# Patient Record
Sex: FEMALE | Race: WHITE | NOT HISPANIC OR LATINO | URBAN - METROPOLITAN AREA
[De-identification: names, ages, dates, MRNs, and addresses within clinical notes are randomized per-mention and may not be internally consistent; named-entity substitution may affect disease eponyms.]

---

## 2019-04-26 ENCOUNTER — EMERGENCY (EMERGENCY)
Facility: HOSPITAL | Age: 59
LOS: 1 days | Discharge: HOME | End: 2019-04-26
Attending: EMERGENCY MEDICINE | Admitting: EMERGENCY MEDICINE
Payer: COMMERCIAL

## 2019-04-26 VITALS
RESPIRATION RATE: 18 BRPM | DIASTOLIC BLOOD PRESSURE: 111 MMHG | OXYGEN SATURATION: 99 % | SYSTOLIC BLOOD PRESSURE: 197 MMHG | HEART RATE: 108 BPM

## 2019-04-26 DIAGNOSIS — Z90.710 ACQUIRED ABSENCE OF BOTH CERVIX AND UTERUS: Chronic | ICD-10-CM

## 2019-04-26 LAB
ALBUMIN SERPL ELPH-MCNC: 3.9 G/DL — SIGNIFICANT CHANGE UP (ref 3.3–5)
ALP SERPL-CCNC: 113 U/L — SIGNIFICANT CHANGE UP (ref 40–120)
ALT FLD-CCNC: 27 U/L — SIGNIFICANT CHANGE UP (ref 12–78)
ANION GAP SERPL CALC-SCNC: 7 MMOL/L — SIGNIFICANT CHANGE UP (ref 5–17)
APTT BLD: 30 SEC — SIGNIFICANT CHANGE UP (ref 28.5–37)
AST SERPL-CCNC: 22 U/L — SIGNIFICANT CHANGE UP (ref 15–37)
BASOPHILS # BLD AUTO: 0.03 K/UL — SIGNIFICANT CHANGE UP (ref 0–0.2)
BASOPHILS NFR BLD AUTO: 0.4 % — SIGNIFICANT CHANGE UP (ref 0–2)
BILIRUB SERPL-MCNC: 0.3 MG/DL — SIGNIFICANT CHANGE UP (ref 0.2–1.2)
BUN SERPL-MCNC: 21 MG/DL — SIGNIFICANT CHANGE UP (ref 7–23)
CALCIUM SERPL-MCNC: 9.1 MG/DL — SIGNIFICANT CHANGE UP (ref 8.5–10.1)
CHLORIDE SERPL-SCNC: 106 MMOL/L — SIGNIFICANT CHANGE UP (ref 96–108)
CO2 SERPL-SCNC: 28 MMOL/L — SIGNIFICANT CHANGE UP (ref 22–31)
CREAT SERPL-MCNC: 0.94 MG/DL — SIGNIFICANT CHANGE UP (ref 0.5–1.3)
EOSINOPHIL # BLD AUTO: 0.08 K/UL — SIGNIFICANT CHANGE UP (ref 0–0.5)
EOSINOPHIL NFR BLD AUTO: 1 % — SIGNIFICANT CHANGE UP (ref 0–6)
GLUCOSE SERPL-MCNC: 109 MG/DL — HIGH (ref 70–99)
HCT VFR BLD CALC: 42 % — SIGNIFICANT CHANGE UP (ref 34.5–45)
HGB BLD-MCNC: 14.2 G/DL — SIGNIFICANT CHANGE UP (ref 11.5–15.5)
IMM GRANULOCYTES NFR BLD AUTO: 0.2 % — SIGNIFICANT CHANGE UP (ref 0–1.5)
INR BLD: 0.91 RATIO — SIGNIFICANT CHANGE UP (ref 0.88–1.16)
LACTATE SERPL-SCNC: 1.6 MMOL/L — SIGNIFICANT CHANGE UP (ref 0.7–2)
LIDOCAIN IGE QN: 147 U/L — SIGNIFICANT CHANGE UP (ref 73–393)
LYMPHOCYTES # BLD AUTO: 2.25 K/UL — SIGNIFICANT CHANGE UP (ref 1–3.3)
LYMPHOCYTES # BLD AUTO: 28.1 % — SIGNIFICANT CHANGE UP (ref 13–44)
MCHC RBC-ENTMCNC: 32.2 PG — SIGNIFICANT CHANGE UP (ref 27–34)
MCHC RBC-ENTMCNC: 33.8 GM/DL — SIGNIFICANT CHANGE UP (ref 32–36)
MCV RBC AUTO: 95.2 FL — SIGNIFICANT CHANGE UP (ref 80–100)
MONOCYTES # BLD AUTO: 0.55 K/UL — SIGNIFICANT CHANGE UP (ref 0–0.9)
MONOCYTES NFR BLD AUTO: 6.9 % — SIGNIFICANT CHANGE UP (ref 2–14)
NEUTROPHILS # BLD AUTO: 5.09 K/UL — SIGNIFICANT CHANGE UP (ref 1.8–7.4)
NEUTROPHILS NFR BLD AUTO: 63.4 % — SIGNIFICANT CHANGE UP (ref 43–77)
NRBC # BLD: 0 /100 WBCS — SIGNIFICANT CHANGE UP (ref 0–0)
PLATELET # BLD AUTO: 229 K/UL — SIGNIFICANT CHANGE UP (ref 150–400)
POTASSIUM SERPL-MCNC: 3.6 MMOL/L — SIGNIFICANT CHANGE UP (ref 3.5–5.3)
POTASSIUM SERPL-SCNC: 3.6 MMOL/L — SIGNIFICANT CHANGE UP (ref 3.5–5.3)
PROT SERPL-MCNC: 7.5 G/DL — SIGNIFICANT CHANGE UP (ref 6–8.3)
PROTHROM AB SERPL-ACNC: 10.3 SEC — SIGNIFICANT CHANGE UP (ref 10–12.9)
RBC # BLD: 4.41 M/UL — SIGNIFICANT CHANGE UP (ref 3.8–5.2)
RBC # FLD: 11.9 % — SIGNIFICANT CHANGE UP (ref 10.3–14.5)
SODIUM SERPL-SCNC: 141 MMOL/L — SIGNIFICANT CHANGE UP (ref 135–145)
WBC # BLD: 8.02 K/UL — SIGNIFICANT CHANGE UP (ref 3.8–10.5)
WBC # FLD AUTO: 8.02 K/UL — SIGNIFICANT CHANGE UP (ref 3.8–10.5)

## 2019-04-26 PROCEDURE — 99285 EMERGENCY DEPT VISIT HI MDM: CPT

## 2019-04-26 PROCEDURE — 74176 CT ABD & PELVIS W/O CONTRAST: CPT | Mod: 26

## 2019-04-26 RX ORDER — HYDROMORPHONE HYDROCHLORIDE 2 MG/ML
0.5 INJECTION INTRAMUSCULAR; INTRAVENOUS; SUBCUTANEOUS ONCE
Qty: 0 | Refills: 0 | Status: DISCONTINUED | OUTPATIENT
Start: 2019-04-26 | End: 2019-04-26

## 2019-04-26 RX ORDER — MORPHINE SULFATE 50 MG/1
4 CAPSULE, EXTENDED RELEASE ORAL ONCE
Qty: 0 | Refills: 0 | Status: DISCONTINUED | OUTPATIENT
Start: 2019-04-26 | End: 2019-04-26

## 2019-04-26 RX ORDER — TAMSULOSIN HYDROCHLORIDE 0.4 MG/1
0.4 CAPSULE ORAL ONCE
Qty: 0 | Refills: 0 | Status: COMPLETED | OUTPATIENT
Start: 2019-04-26 | End: 2019-04-26

## 2019-04-26 RX ORDER — PIPERACILLIN AND TAZOBACTAM 4; .5 G/20ML; G/20ML
3.38 INJECTION, POWDER, LYOPHILIZED, FOR SOLUTION INTRAVENOUS ONCE
Qty: 0 | Refills: 0 | Status: COMPLETED | OUTPATIENT
Start: 2019-04-26 | End: 2019-04-26

## 2019-04-26 RX ORDER — ONDANSETRON 8 MG/1
4 TABLET, FILM COATED ORAL ONCE
Qty: 0 | Refills: 0 | Status: COMPLETED | OUTPATIENT
Start: 2019-04-26 | End: 2019-04-26

## 2019-04-26 RX ORDER — SODIUM CHLORIDE 9 MG/ML
3 INJECTION INTRAMUSCULAR; INTRAVENOUS; SUBCUTANEOUS ONCE
Qty: 0 | Refills: 0 | Status: COMPLETED | OUTPATIENT
Start: 2019-04-26 | End: 2019-04-26

## 2019-04-26 RX ORDER — SODIUM CHLORIDE 9 MG/ML
1000 INJECTION INTRAMUSCULAR; INTRAVENOUS; SUBCUTANEOUS ONCE
Qty: 0 | Refills: 0 | Status: COMPLETED | OUTPATIENT
Start: 2019-04-26 | End: 2019-04-26

## 2019-04-26 RX ORDER — SODIUM CHLORIDE 9 MG/ML
1000 INJECTION INTRAMUSCULAR; INTRAVENOUS; SUBCUTANEOUS
Qty: 0 | Refills: 0 | Status: DISCONTINUED | OUTPATIENT
Start: 2019-04-26 | End: 2019-04-30

## 2019-04-26 RX ADMIN — MORPHINE SULFATE 4 MILLIGRAM(S): 50 CAPSULE, EXTENDED RELEASE ORAL at 22:06

## 2019-04-26 RX ADMIN — SODIUM CHLORIDE 3 MILLILITER(S): 9 INJECTION INTRAMUSCULAR; INTRAVENOUS; SUBCUTANEOUS at 22:07

## 2019-04-26 RX ADMIN — SODIUM CHLORIDE 125 MILLILITER(S): 9 INJECTION INTRAMUSCULAR; INTRAVENOUS; SUBCUTANEOUS at 23:56

## 2019-04-26 RX ADMIN — ONDANSETRON 4 MILLIGRAM(S): 8 TABLET, FILM COATED ORAL at 22:06

## 2019-04-26 RX ADMIN — HYDROMORPHONE HYDROCHLORIDE 0.5 MILLIGRAM(S): 2 INJECTION INTRAMUSCULAR; INTRAVENOUS; SUBCUTANEOUS at 22:45

## 2019-04-26 RX ADMIN — HYDROMORPHONE HYDROCHLORIDE 0.5 MILLIGRAM(S): 2 INJECTION INTRAMUSCULAR; INTRAVENOUS; SUBCUTANEOUS at 22:34

## 2019-04-26 RX ADMIN — TAMSULOSIN HYDROCHLORIDE 0.4 MILLIGRAM(S): 0.4 CAPSULE ORAL at 23:57

## 2019-04-26 RX ADMIN — PIPERACILLIN AND TAZOBACTAM 200 GRAM(S): 4; .5 INJECTION, POWDER, LYOPHILIZED, FOR SOLUTION INTRAVENOUS at 23:57

## 2019-04-26 RX ADMIN — SODIUM CHLORIDE 1000 MILLILITER(S): 9 INJECTION INTRAMUSCULAR; INTRAVENOUS; SUBCUTANEOUS at 23:06

## 2019-04-26 RX ADMIN — SODIUM CHLORIDE 1000 MILLILITER(S): 9 INJECTION INTRAMUSCULAR; INTRAVENOUS; SUBCUTANEOUS at 22:06

## 2019-04-26 RX ADMIN — MORPHINE SULFATE 4 MILLIGRAM(S): 50 CAPSULE, EXTENDED RELEASE ORAL at 22:20

## 2019-04-26 NOTE — ED ADULT TRIAGE NOTE - CHIEF COMPLAINT QUOTE
patient c/o RLQ pain radiating around to flank area, states she believes she has a kidney stone, crying in triage

## 2019-04-26 NOTE — ED PROVIDER NOTE - OBJECTIVE STATEMENT
PT is a 59 yo f who has remote hx of renal colic  sp hyst allergic to keflex, hx fo mvp, was in [facility]   saw her pmd dr ward for routine well visit found blood in urine. today developed intermittent and tonight severe r flank pain to her groin and abd the pain is intermittent 10 /10 bib  for eval. on arrival to er the pain was almost gone

## 2019-04-26 NOTE — ED PROVIDER NOTE - NSFOLLOWUPINSTRUCTIONS_ED_ALL_ED_FT
1) Follow-up with your Primary Medical Doctor or referred doctor. Call today / next business day for prompt follow-up.  2) Return to Emergency room for any worsening or persistent pain, weakness,  vomiting, diarrhea, unable to eat / drink, weak or dizzy, or any other concerning symptoms.   -If you have ANY FEVER, you must return to ED immediately  3) See attached instruction sheets for additional information, including information regarding signs and symptoms to look out for, reasons to seek immediate care and other important instructions.  4) Follow-up with Urology, see attached. Call ASAP for appointment.

## 2019-04-26 NOTE — ED ADULT NURSE NOTE - OBJECTIVE STATEMENT
Pt received alert and oriented x 4 c/o pain to mid lower abd radiating to RLQ to right flank and right lower back, 10/10 sharp x hours. Pt reported nausea but denies vomiting, diarrheam fever, chill, sob, chest pain. Pt denies any relieving/ aggravating factors. Pt reports seeing PMD and PMD called today and stated blood in urine. Pt denies dysuria, burning on urination.

## 2019-04-26 NOTE — ED PROVIDER NOTE - CLINICAL SUMMARY MEDICAL DECISION MAKING FREE TEXT BOX
ro appy ro renal colic ro other causes of severe r flank pain nausea in female pt with hx of remote stones and severe pain with hematuria

## 2019-04-26 NOTE — ED PROVIDER NOTE - PROGRESS NOTE DETAILS
pt came from exam room diaphoretic and very uncomfortable pt and  aware of dx she had another bout of pain now better pain resolved iv abx and fluids infusing

## 2019-04-27 VITALS
DIASTOLIC BLOOD PRESSURE: 82 MMHG | SYSTOLIC BLOOD PRESSURE: 157 MMHG | RESPIRATION RATE: 16 BRPM | TEMPERATURE: 98 F | HEART RATE: 81 BPM | OXYGEN SATURATION: 99 %

## 2019-04-27 LAB
APPEARANCE UR: CLEAR — SIGNIFICANT CHANGE UP
BACTERIA # UR AUTO: ABNORMAL
BILIRUB UR-MCNC: NEGATIVE — SIGNIFICANT CHANGE UP
COLOR SPEC: SIGNIFICANT CHANGE UP
COMMENT - URINE: SIGNIFICANT CHANGE UP
DIFF PNL FLD: ABNORMAL
EPI CELLS # UR: SIGNIFICANT CHANGE UP
GLUCOSE UR QL: NEGATIVE — SIGNIFICANT CHANGE UP
KETONES UR-MCNC: NEGATIVE — SIGNIFICANT CHANGE UP
LEUKOCYTE ESTERASE UR-ACNC: NEGATIVE — SIGNIFICANT CHANGE UP
NITRITE UR-MCNC: NEGATIVE — SIGNIFICANT CHANGE UP
PH UR: 7 — SIGNIFICANT CHANGE UP (ref 5–8)
PROT UR-MCNC: NEGATIVE — SIGNIFICANT CHANGE UP
RBC CASTS # UR COMP ASSIST: ABNORMAL /HPF (ref 0–4)
SP GR SPEC: 1.01 — SIGNIFICANT CHANGE UP (ref 1.01–1.02)
UROBILINOGEN FLD QL: NEGATIVE — SIGNIFICANT CHANGE UP
WBC UR QL: SIGNIFICANT CHANGE UP

## 2019-04-27 PROCEDURE — 85027 COMPLETE CBC AUTOMATED: CPT

## 2019-04-27 PROCEDURE — 87086 URINE CULTURE/COLONY COUNT: CPT

## 2019-04-27 PROCEDURE — 87040 BLOOD CULTURE FOR BACTERIA: CPT

## 2019-04-27 PROCEDURE — 74176 CT ABD & PELVIS W/O CONTRAST: CPT

## 2019-04-27 PROCEDURE — 85730 THROMBOPLASTIN TIME PARTIAL: CPT

## 2019-04-27 PROCEDURE — 96375 TX/PRO/DX INJ NEW DRUG ADDON: CPT

## 2019-04-27 PROCEDURE — 96376 TX/PRO/DX INJ SAME DRUG ADON: CPT

## 2019-04-27 PROCEDURE — 96361 HYDRATE IV INFUSION ADD-ON: CPT

## 2019-04-27 PROCEDURE — 99284 EMERGENCY DEPT VISIT MOD MDM: CPT | Mod: 25

## 2019-04-27 PROCEDURE — 80053 COMPREHEN METABOLIC PANEL: CPT

## 2019-04-27 PROCEDURE — 83690 ASSAY OF LIPASE: CPT

## 2019-04-27 PROCEDURE — 85610 PROTHROMBIN TIME: CPT

## 2019-04-27 PROCEDURE — 96365 THER/PROPH/DIAG IV INF INIT: CPT

## 2019-04-27 PROCEDURE — 83605 ASSAY OF LACTIC ACID: CPT

## 2019-04-27 PROCEDURE — 81001 URINALYSIS AUTO W/SCOPE: CPT

## 2019-04-27 PROCEDURE — 36415 COLL VENOUS BLD VENIPUNCTURE: CPT

## 2019-04-27 RX ORDER — AZTREONAM 2 G
1 VIAL (EA) INJECTION
Qty: 14 | Refills: 0
Start: 2019-04-27 | End: 2019-05-03

## 2019-04-27 RX ORDER — ONDANSETRON 8 MG/1
1 TABLET, FILM COATED ORAL
Qty: 30 | Refills: 0
Start: 2019-04-27

## 2019-04-27 RX ORDER — ONDANSETRON 8 MG/1
4 TABLET, FILM COATED ORAL ONCE
Qty: 0 | Refills: 0 | Status: COMPLETED | OUTPATIENT
Start: 2019-04-27 | End: 2019-04-27

## 2019-04-27 RX ORDER — TAMSULOSIN HYDROCHLORIDE 0.4 MG/1
1 CAPSULE ORAL
Qty: 30 | Refills: 0
Start: 2019-04-27 | End: 2019-05-26

## 2019-04-27 RX ADMIN — ONDANSETRON 4 MILLIGRAM(S): 8 TABLET, FILM COATED ORAL at 04:20

## 2019-04-27 RX ADMIN — HYDROMORPHONE HYDROCHLORIDE 0.5 MILLIGRAM(S): 2 INJECTION INTRAMUSCULAR; INTRAVENOUS; SUBCUTANEOUS at 00:05

## 2019-04-27 RX ADMIN — PIPERACILLIN AND TAZOBACTAM 3.38 GRAM(S): 4; .5 INJECTION, POWDER, LYOPHILIZED, FOR SOLUTION INTRAVENOUS at 00:27

## 2019-04-27 RX ADMIN — HYDROMORPHONE HYDROCHLORIDE 0.5 MILLIGRAM(S): 2 INJECTION INTRAMUSCULAR; INTRAVENOUS; SUBCUTANEOUS at 00:20

## 2019-04-27 RX ADMIN — SODIUM CHLORIDE 125 MILLILITER(S): 9 INJECTION INTRAMUSCULAR; INTRAVENOUS; SUBCUTANEOUS at 00:05

## 2019-04-27 RX ADMIN — ONDANSETRON 4 MILLIGRAM(S): 8 TABLET, FILM COATED ORAL at 00:06

## 2019-04-28 LAB
CULTURE RESULTS: NO GROWTH — SIGNIFICANT CHANGE UP
SPECIMEN SOURCE: SIGNIFICANT CHANGE UP

## 2019-05-02 LAB
CULTURE RESULTS: SIGNIFICANT CHANGE UP
CULTURE RESULTS: SIGNIFICANT CHANGE UP
SPECIMEN SOURCE: SIGNIFICANT CHANGE UP
SPECIMEN SOURCE: SIGNIFICANT CHANGE UP

## 2022-03-01 ENCOUNTER — INPATIENT (INPATIENT)
Facility: HOSPITAL | Age: 62
LOS: 1 days | Discharge: HOME | DRG: 390 | End: 2022-03-03
Attending: SURGERY | Admitting: SURGERY
Payer: COMMERCIAL

## 2022-03-01 VITALS
HEIGHT: 65 IN | TEMPERATURE: 98 F | WEIGHT: 154.98 LBS | SYSTOLIC BLOOD PRESSURE: 162 MMHG | OXYGEN SATURATION: 100 % | DIASTOLIC BLOOD PRESSURE: 102 MMHG | RESPIRATION RATE: 22 BRPM | HEART RATE: 82 BPM

## 2022-03-01 DIAGNOSIS — Z90.710 ACQUIRED ABSENCE OF BOTH CERVIX AND UTERUS: Chronic | ICD-10-CM

## 2022-03-01 LAB
ALBUMIN SERPL ELPH-MCNC: 4.1 G/DL — SIGNIFICANT CHANGE UP (ref 3.3–5)
ALP SERPL-CCNC: 84 U/L — SIGNIFICANT CHANGE UP (ref 40–120)
ALT FLD-CCNC: 29 U/L — SIGNIFICANT CHANGE UP (ref 12–78)
ANION GAP SERPL CALC-SCNC: 7 MMOL/L — SIGNIFICANT CHANGE UP (ref 5–17)
AST SERPL-CCNC: 28 U/L — SIGNIFICANT CHANGE UP (ref 15–37)
BASOPHILS # BLD AUTO: 0.03 K/UL — SIGNIFICANT CHANGE UP (ref 0–0.2)
BASOPHILS NFR BLD AUTO: 0.3 % — SIGNIFICANT CHANGE UP (ref 0–2)
BILIRUB SERPL-MCNC: 0.6 MG/DL — SIGNIFICANT CHANGE UP (ref 0.2–1.2)
BUN SERPL-MCNC: 17 MG/DL — SIGNIFICANT CHANGE UP (ref 7–23)
CALCIUM SERPL-MCNC: 9.8 MG/DL — SIGNIFICANT CHANGE UP (ref 8.5–10.1)
CHLORIDE SERPL-SCNC: 107 MMOL/L — SIGNIFICANT CHANGE UP (ref 96–108)
CO2 SERPL-SCNC: 28 MMOL/L — SIGNIFICANT CHANGE UP (ref 22–31)
CREAT SERPL-MCNC: 0.74 MG/DL — SIGNIFICANT CHANGE UP (ref 0.5–1.3)
EGFR: 91 ML/MIN/1.73M2 — SIGNIFICANT CHANGE UP
EOSINOPHIL # BLD AUTO: 0.06 K/UL — SIGNIFICANT CHANGE UP (ref 0–0.5)
EOSINOPHIL NFR BLD AUTO: 0.7 % — SIGNIFICANT CHANGE UP (ref 0–6)
GLUCOSE SERPL-MCNC: 106 MG/DL — HIGH (ref 70–99)
HCT VFR BLD CALC: 43.3 % — SIGNIFICANT CHANGE UP (ref 34.5–45)
HGB BLD-MCNC: 15 G/DL — SIGNIFICANT CHANGE UP (ref 11.5–15.5)
IMM GRANULOCYTES NFR BLD AUTO: 0.3 % — SIGNIFICANT CHANGE UP (ref 0–1.5)
LIDOCAIN IGE QN: 86 U/L — SIGNIFICANT CHANGE UP (ref 73–393)
LYMPHOCYTES # BLD AUTO: 2.23 K/UL — SIGNIFICANT CHANGE UP (ref 1–3.3)
LYMPHOCYTES # BLD AUTO: 25.2 % — SIGNIFICANT CHANGE UP (ref 13–44)
MCHC RBC-ENTMCNC: 32.7 PG — SIGNIFICANT CHANGE UP (ref 27–34)
MCHC RBC-ENTMCNC: 34.6 GM/DL — SIGNIFICANT CHANGE UP (ref 32–36)
MCV RBC AUTO: 94.3 FL — SIGNIFICANT CHANGE UP (ref 80–100)
MONOCYTES # BLD AUTO: 0.54 K/UL — SIGNIFICANT CHANGE UP (ref 0–0.9)
MONOCYTES NFR BLD AUTO: 6.1 % — SIGNIFICANT CHANGE UP (ref 2–14)
NEUTROPHILS # BLD AUTO: 5.97 K/UL — SIGNIFICANT CHANGE UP (ref 1.8–7.4)
NEUTROPHILS NFR BLD AUTO: 67.4 % — SIGNIFICANT CHANGE UP (ref 43–77)
NRBC # BLD: 0 /100 WBCS — SIGNIFICANT CHANGE UP (ref 0–0)
PLATELET # BLD AUTO: 252 K/UL — SIGNIFICANT CHANGE UP (ref 150–400)
POTASSIUM SERPL-MCNC: 3.9 MMOL/L — SIGNIFICANT CHANGE UP (ref 3.5–5.3)
POTASSIUM SERPL-SCNC: 3.9 MMOL/L — SIGNIFICANT CHANGE UP (ref 3.5–5.3)
PROT SERPL-MCNC: 7.7 G/DL — SIGNIFICANT CHANGE UP (ref 6–8.3)
RBC # BLD: 4.59 M/UL — SIGNIFICANT CHANGE UP (ref 3.8–5.2)
RBC # FLD: 12 % — SIGNIFICANT CHANGE UP (ref 10.3–14.5)
SARS-COV-2 RNA SPEC QL NAA+PROBE: SIGNIFICANT CHANGE UP
SODIUM SERPL-SCNC: 142 MMOL/L — SIGNIFICANT CHANGE UP (ref 135–145)
TROPONIN I, HIGH SENSITIVITY RESULT: 7.6 NG/L — SIGNIFICANT CHANGE UP
WBC # BLD: 8.86 K/UL — SIGNIFICANT CHANGE UP (ref 3.8–10.5)
WBC # FLD AUTO: 8.86 K/UL — SIGNIFICANT CHANGE UP (ref 3.8–10.5)

## 2022-03-01 PROCEDURE — 93010 ELECTROCARDIOGRAM REPORT: CPT

## 2022-03-01 PROCEDURE — 99285 EMERGENCY DEPT VISIT HI MDM: CPT

## 2022-03-01 PROCEDURE — 71045 X-RAY EXAM CHEST 1 VIEW: CPT | Mod: 26

## 2022-03-01 PROCEDURE — 76700 US EXAM ABDOM COMPLETE: CPT | Mod: 26

## 2022-03-01 RX ORDER — SODIUM CHLORIDE 9 MG/ML
1000 INJECTION INTRAMUSCULAR; INTRAVENOUS; SUBCUTANEOUS ONCE
Refills: 0 | Status: COMPLETED | OUTPATIENT
Start: 2022-03-01 | End: 2022-03-01

## 2022-03-01 RX ORDER — ONDANSETRON 8 MG/1
4 TABLET, FILM COATED ORAL ONCE
Refills: 0 | Status: COMPLETED | OUTPATIENT
Start: 2022-03-01 | End: 2022-03-01

## 2022-03-01 RX ORDER — MORPHINE SULFATE 50 MG/1
4 CAPSULE, EXTENDED RELEASE ORAL ONCE
Refills: 0 | Status: DISCONTINUED | OUTPATIENT
Start: 2022-03-01 | End: 2022-03-01

## 2022-03-01 RX ADMIN — SODIUM CHLORIDE 1000 MILLILITER(S): 9 INJECTION INTRAMUSCULAR; INTRAVENOUS; SUBCUTANEOUS at 20:58

## 2022-03-01 RX ADMIN — MORPHINE SULFATE 4 MILLIGRAM(S): 50 CAPSULE, EXTENDED RELEASE ORAL at 20:58

## 2022-03-01 RX ADMIN — ONDANSETRON 4 MILLIGRAM(S): 8 TABLET, FILM COATED ORAL at 20:58

## 2022-03-01 NOTE — ED PROVIDER NOTE - OBJECTIVE STATEMENT
pt c/o few hours of cramping upper abd pain radiating across upper abd and into chest, associated with n/v. no fevers, chills, sob, cough, diarrhea, dysuria, hematuria, freq.  pmd - li

## 2022-03-01 NOTE — ED PROVIDER NOTE - CLINICAL SUMMARY MEDICAL DECISION MAKING FREE TEXT BOX
pt with few hrs of upper abd cramping pain radiating across upper abd and into chest - ekg/cxr/us/labs/ivf/morphine/zofran

## 2022-03-02 DIAGNOSIS — K56.609 UNSPECIFIED INTESTINAL OBSTRUCTION, UNSPECIFIED AS TO PARTIAL VERSUS COMPLETE OBSTRUCTION: ICD-10-CM

## 2022-03-02 LAB — LACTATE SERPL-SCNC: 0.7 MMOL/L — SIGNIFICANT CHANGE UP (ref 0.7–2)

## 2022-03-02 PROCEDURE — 74250 X-RAY XM SM INT 1CNTRST STD: CPT | Mod: 26

## 2022-03-02 PROCEDURE — 71275 CT ANGIOGRAPHY CHEST: CPT | Mod: 26,MA

## 2022-03-02 PROCEDURE — 74177 CT ABD & PELVIS W/CONTRAST: CPT | Mod: 26,MA

## 2022-03-02 PROCEDURE — 99223 1ST HOSP IP/OBS HIGH 75: CPT

## 2022-03-02 RX ORDER — DIATRIZOATE MEGLUMINE 180 MG/ML
100 INJECTION, SOLUTION INTRAVESICAL ONCE
Refills: 0 | Status: COMPLETED | OUTPATIENT
Start: 2022-03-02 | End: 2022-03-02

## 2022-03-02 RX ORDER — SODIUM CHLORIDE 9 MG/ML
1000 INJECTION, SOLUTION INTRAVENOUS
Refills: 0 | Status: DISCONTINUED | OUTPATIENT
Start: 2022-03-02 | End: 2022-03-02

## 2022-03-02 RX ORDER — METOPROLOL TARTRATE 50 MG
25 TABLET ORAL DAILY
Refills: 0 | Status: DISCONTINUED | OUTPATIENT
Start: 2022-03-02 | End: 2022-03-03

## 2022-03-02 RX ORDER — ONDANSETRON 8 MG/1
4 TABLET, FILM COATED ORAL EVERY 6 HOURS
Refills: 0 | Status: DISCONTINUED | OUTPATIENT
Start: 2022-03-02 | End: 2022-03-03

## 2022-03-02 RX ORDER — SODIUM CHLORIDE 9 MG/ML
1000 INJECTION, SOLUTION INTRAVENOUS
Refills: 0 | Status: DISCONTINUED | OUTPATIENT
Start: 2022-03-02 | End: 2022-03-03

## 2022-03-02 RX ADMIN — DIATRIZOATE MEGLUMINE 100 MILLILITER(S): 180 INJECTION, SOLUTION INTRAVESICAL at 09:44

## 2022-03-02 RX ADMIN — Medication 25 MILLIGRAM(S): at 06:33

## 2022-03-02 RX ADMIN — SODIUM CHLORIDE 100 MILLILITER(S): 9 INJECTION, SOLUTION INTRAVENOUS at 18:11

## 2022-03-02 NOTE — PATIENT PROFILE ADULT - FALL HARM RISK - UNIVERSAL INTERVENTIONS
Bed in lowest position, wheels locked, appropriate side rails in place/Call bell, personal items and telephone in reach/Instruct patient to call for assistance before getting out of bed or chair/Non-slip footwear when patient is out of bed/[place] to call system/Physically safe environment - no spills, clutter or unnecessary equipment/Purposeful Proactive Rounding/Room/bathroom lighting operational, light cord in reach

## 2022-03-02 NOTE — H&P ADULT - ATTENDING COMMENTS
Case discussed with PA earlier and seen by me independently shortly thereafter.  History, blood work and imaging personally reviewed.  Pt examined.  Resting comfortably in stretcher in ED.  NO acute distress.  States pain began while at work yesterday approx 5-530pm.  States she wasn't eating as per usual.  Only ate "junk", chips, etc.  Thought pains were similar to symptoms she experienced with previous kidney stones but this was more in the from of her abdomen and didn't' seem to relent.  She vomited once while in ED last night.  Now states she's feeling much better as colicky pain has subsided.    Abdomen remains soft, nondistended.  No rebound or guarding.  Surgical scars consistent with /Hysterectomy.  No hernia or masses.    CT scan suggestive of early or partial SBO.  Trace fluid.  Transition in upper abdomen.  This however was performed without oral contrast to the extent of obstruction is difficult to ascertain.  Bowel loops are not markedly dilated although somewhat fluid filled immediately proximal to the "transition".  Colon remains air and fecal filled.    I suspect the CT findings could represent ileus vs partial SBO.  As such she has been admitted for observation.  Will order Gastrografin challenge/SBS to assess progression and transit through GI tract in hopes the "obstruction" has resolved.    I've discussed plans with patient as well as potential need for surgical exploration (laparoscopy vs open) in the event that symptoms fail to resolve and/or "obstruction" persists.    All questions answered.

## 2022-03-02 NOTE — DISCHARGE NOTE PROVIDER - NSDCMRMEDTOKEN_GEN_ALL_CORE_FT
metoprolol tartrate 25 mg oral tablet:    metoprolol tartrate 25 mg oral tablet: 1 tab(s) orally once a day

## 2022-03-02 NOTE — DISCHARGE NOTE PROVIDER - CARE PROVIDER_API CALL
Yaw Smith)  Surgery  [address] [place] [address]  Phone: [phone]  Fax: [phone]  Follow Up Time:

## 2022-03-02 NOTE — H&P ADULT - NEGATIVE GASTROINTESTINAL SYMPTOMS
no diarrhea/no constipation/no change in bowel habits/no melena/no hematochezia/no steatorrhea/no jaundice/no hiccoughs

## 2022-03-02 NOTE — DISCHARGE NOTE PROVIDER - HOSPITAL COURSE
HPI: 62 YO FEMALE WITH PAST MEDICAL HISTORY OF HTN WAS IN HER USUAL STATE OF HEALTH UNTIL 5 PM YESTERDAY WHEN SHE EXPERIENCE SUDDEN SEVERE LOWER ABDOMINAL PAIN AND URGENCY TO DEFECATE. HER ABDOMINAL PAIN GOT WORSE AFTER BOWEL MOVEMENT AND BECAME NAUSEOUS UPON ARRIVAL TO ER AND HAD ONE EPISODE OF VOMITING. SHE DENIES ANY FEVER, CHILLS, DIARRHEA,  CONSTIPATION, RECENT TRAVEL ,HEPATITIS, HISTORY OF GALLSTONES OR  PUD, MELENA, HEMATOCHEZIA, NSAIDs/ DRUGS / ETOH USE, TRAUMA, URINARY SYMPTOMS. SHE ATE ALL DAY UNTIL START OF HER PAIN AT 5 PM, SHE HAD A NORMAL BM AFTER 5 PM. HER LAST COLONOSCOPY WAS OVER 5 YEARS AGO.    In the ED: Pt vomited once in ED  Abdominal US showed no evidence of cholelithiasis. Pt also worked up for possible PE. CT abd/pelvis reading revealed, "No pulmonary embolism or other acute intrathoracic pathology. Low grade distal small bowel obstruction" with transition in upper abdomen. Following CTA also negative. At time of consult, pt's abdominal  pain subsided.   At the time of imaging, considering ileus vs partial SBO, pt admitted for observation.    On floor: Gastrograffin challenge ordered, contrast was seen ____________.    Patient improved clinically throughout hospitalization, remained hemodynamically stable throughout hospital stay. Overall stay uneventful.           HPI: 62 YO FEMALE WITH PAST MEDICAL HISTORY OF HTN WAS IN HER USUAL STATE OF HEALTH UNTIL 5 PM YESTERDAY WHEN SHE EXPERIENCE SUDDEN SEVERE LOWER ABDOMINAL PAIN AND URGENCY TO DEFECATE. HER ABDOMINAL PAIN GOT WORSE AFTER BOWEL MOVEMENT AND BECAME NAUSEOUS UPON ARRIVAL TO ER AND HAD ONE EPISODE OF VOMITING. SHE DENIES ANY FEVER, CHILLS, DIARRHEA,  CONSTIPATION, RECENT TRAVEL ,HEPATITIS, HISTORY OF GALLSTONES OR  PUD, MELENA, HEMATOCHEZIA, NSAIDs/ DRUGS / ETOH USE, TRAUMA, URINARY SYMPTOMS. SHE ATE ALL DAY UNTIL START OF HER PAIN AT 5 PM, SHE HAD A NORMAL BM AFTER 5 PM. HER LAST COLONOSCOPY WAS OVER 5 YEARS AGO.    In the ED: Pt vomited once in ED  Abdominal US showed no evidence of cholelithiasis. Pt also worked up for possible PE. CT abd/pelvis reading revealed, "No pulmonary embolism or other acute intrathoracic pathology. Low grade distal small bowel obstruction" with transition in upper abdomen. Following CTA also negative. At time of consult, pt's abdominal  pain subsided.   At the time of imaging, considering ileus vs partial SBO, pt admitted for observation.    On floor: Gastrograffin challenge ordered and follow up Xray revealed no obstruction.     Patient improved clinically throughout hospitalization, remained hemodynamically stable throughout hospital stay. Overall stay uneventful. Cleared for discharge after diet toleration.    DISPO: F/U with Dr. Smith as outpatient             HPI: 62 YO FEMALE WITH PAST MEDICAL HISTORY OF HTN WAS IN HER USUAL STATE OF HEALTH UNTIL 5 PM YESTERDAY WHEN SHE EXPERIENCE SUDDEN SEVERE LOWER ABDOMINAL PAIN AND URGENCY TO DEFECATE. HER ABDOMINAL PAIN GOT WORSE AFTER BOWEL MOVEMENT AND BECAME NAUSEOUS UPON ARRIVAL TO ER AND HAD ONE EPISODE OF VOMITING. SHE DENIES ANY FEVER, CHILLS, DIARRHEA,  CONSTIPATION, RECENT TRAVEL ,HEPATITIS, HISTORY OF GALLSTONES OR  PUD, MELENA, HEMATOCHEZIA, NSAIDs/ DRUGS / ETOH USE, TRAUMA, URINARY SYMPTOMS. SHE ATE ALL DAY UNTIL START OF HER PAIN AT 5 PM, SHE HAD A NORMAL BM AFTER 5 PM. HER LAST COLONOSCOPY WAS OVER 5 YEARS AGO.    In the ED: Pt vomited once in ED  Abdominal US showed no evidence of cholelithiasis. Pt also worked up for possible PE. CT abd/pelvis reading revealed, "No pulmonary embolism or other acute intrathoracic pathology. Low grade distal small bowel obstruction" with transition in upper abdomen. Following CTA also negative. At time of consult, pt's abdominal  pain subsided.   At the time of imaging, considering ileus vs partial SBO, pt admitted for observation.    On floor: Gastrograffin challenge ordered and follow up Xray revealed no obstruction.     Patient improved clinically throughout hospitalization, remained hemodynamically stable throughout hospital stay. Overall stay uneventful. Cleared for discharge after diet toleration.    DISPO: F/U with Dr. Smith and primary physician as outpatient

## 2022-03-02 NOTE — H&P ADULT - NSHPPHYSICALEXAM_GEN_ALL_CORE
Vital Signs (24 Hrs):  T(C): 36.6 (03-01-22 @ 19:41), Max: 36.6 (03-01-22 @ 19:41)  HR: 76 (03-02-22 @ 03:04) (76 - 82)  BP: 161/80 (03-02-22 @ 03:04) (159/77 - 190/92)  RR: 16 (03-02-22 @ 03:04) (16 - 22)  SpO2: 98% (03-02-22 @ 03:04) (98% - 100%)

## 2022-03-02 NOTE — H&P ADULT - ASSESSMENT
ABDOMINAL PAIN WITH N/V, FEELING BETTER , NO PAIN MEDICATIONS SINCE 9 PM  CT ABDOMEN AND PELVIS ONLY WITH IV CONTRAST CONCERN FOR LOW GRADE DISTAL SMALL BOWEL OBSTRUCTION   HISTORY OF HTN    ADMIT  NPO EXCEPT MEDICATIONS  OBSERVE   SERIAL ABDOMINAL EXAM  SERUM LACTATE   KUB AND REPEAT LABS IN AM  SURGICAL INTERVENTION,  REPEAT IMAGING PENDING PROGRESS, RETURN OF SYMPTOMS, REEVALUATION   SURGICAL TEAM WILL FOLLOW UP       PLAN DISCUSSED WITH PATIENT AND DR. NOLAN ER

## 2022-03-02 NOTE — DISCHARGE NOTE PROVIDER - NSDCACTIVITY_GEN_ALL_CORE
No restrictions/Return to Work/School allowed/Showering allowed/Walking - Indoors allowed/Walking - Outdoors allowed

## 2022-03-02 NOTE — H&P ADULT - NSHPLABSRESULTS_GEN_ALL_CORE
03-01    142  |  107  |  17  ----------------------------<  106<H>  3.9   |  28  |  0.74    Ca    9.8      01 Mar 2022 20:26    TPro  7.7  /  Alb  4.1  /  TBili  0.6  /  DBili  x   /  AST  28  /  ALT  29  /  AlkPhos  84  03-01                        15.0   8.86  )-----------( 252      ( 01 Mar 2022 20:26 )             43.3     COVID-19 PCR: NotDete (01 Mar 2022 23:06)    ACC: [id number] EXAM:  US ABDOMEN COMPLETE                       PROCEDURE DATE:  03/01/2022    INTERPRETATION:  CLINICAL INFORMATION: Upper abdominal pain  COMPARISON: 4/26/2019    TECHNIQUE: Sonography of the abdomen.  FINDINGS:    Liver: Left hepatic lobe cysts measuring up to 0.9 cm, one of which   contains a possible septation.  Bile ducts: Normal caliber. Common bile duct measures 5 mm.  Gallbladder: Within normal limits.  Pancreas: Evaluation of the pancreas is limited secondary to overlying   bowel gas.  Spleen: 8.1 cm. Within normal limits.  Right kidney: 10.1 cm. No hydronephrosis.  Left kidney: 10.3 cm.  No hydronephrosis. 0.5 cm nonobstructing calculus   in the lower pole.  Ascites: None.  Aorta and IVC: Visualized portions are within normal limits.    IMPRESSION:  No cholelithiasis.JORGE ROSSI MD; Attending Radiologist      ACC: [id number] EXAM:  CT ABDOMEN AND PELVIS IC                        ACC: [id number] [facility]AM:  [facility]                        PROCEDURE DATE:  03/02/2022      INTERPRETATION:  CLINICAL INFORMATION: Chest pain, lower abdominalpain   and nausea    COMPARISON: CT abdomen pelvis 4/26/2019    CONTRAST/COMPLICATIONS:  IV Contrast: Omnipaque 350  90 cc administered   10 cc discarded  Oral Contrast: NONE  Complications: None reported at time of study completion    PROCEDURE:  CTAngiography of the Chest was performed followed by portal venous phase   imaging of the Abdomen and Pelvis.  Sagittal and coronal reformats were performed as well as 3D (MIP)   reconstructions.    FINDINGS:  CHEST:  LUNGS AND LARGE AIRWAYS: Patent central airways. No pulmonary nodules.  PLEURA: No pleural effusion.  VESSELS: Within normal limits.  HEART: Heart size is normal. No pericardial effusion.  MEDIASTINUM AND DENA: No lymphadenopathy.  CHEST WALL AND LOWER NECK: Within normal limits.    ABDOMEN AND PELVIS:  LIVER: Multiple scattered too small to characterize hypodensities.  BILE DUCTS: Normal caliber.  GALLBLADDER: Within normal limits.  SPLEEN: Within normal limits.  PANCREAS: Within normal limits.  ADRENALS: Within normal limits.  KIDNEYS/URETERS: Few too small to characterize bilateral renal   hypodensities. Stable punctate nonobstructing stone in the lower pole of   the left kidney.    BLADDER: Within normal limits.  REPRODUCTIVE ORGANS: Hysterectomy.    BOWEL: Small hiatal hernia. Low grade distal small bowel obstruction with   transition point in the left upper quadrant, including trace interloop   free fluid. The colon is decompressed. The appendix is normal.  PERITONEUM: Trace pelvic ascites. No free air.  VESSELS: Atherosclerotic changes.  RETROPERITONEUM/LYMPH NODES: No lymphadenopathy.  ABDOMINAL WALL: Within normal limits.  BONES: Mild degenerative changes in the spine. Stable subcentimeter   sclerotic lesion likely a bone island the L4 vertebral body.    IMPRESSION:  No pulmonary embolism or other acute intrathoracic pathology.    Low grade distal small bowel obstruction.    NENA TRINH MD; Attending Radiologist

## 2022-03-02 NOTE — H&P ADULT - HISTORY OF PRESENT ILLNESS
62 YO FEMALE WITH PAST MEDICAL HISTORY OF HTN WAS IN HER USUAL STATE OF HEALTH UNTIL 5 PM YESTERDAY WHEN SHE EXPERIENCE SUDDEN SEVERE LOWER ABDOMINAL PAIN AND URGENCY TO DEFECATE. HER ABDOMINAL PAIN GOT WORSE AFTER BOWEL MOVEMENT AND BECAME NAUSEOUS UPON ARRIVAL TO ER AND HAD ONE EPISODE OF VOMITING.     SHE DENIES ANY FEVER, CHILLS, DIARRHEA,  CONSTIPATION, RECENT TRAVEL ,HEPATITIS, HISTORY OF GALLSTONES OR  PUD, MELENA, HEMATOCHEZIA, NSAIDs/ DRUGS / ETOH USE, TRAUMA, URINARY SYMPTOMS.     SHE ATE ALL DAY UNTIL START OF HER PAIN AT 5 PM, SHE HAD A NORMAL BM AFTER 5 PM. HER LAST COLONOSCOPE WAS OVER 5 YEARS AGO.

## 2022-03-02 NOTE — DISCHARGE NOTE PROVIDER - NSDCFUADDAPPT_GEN_ALL_CORE_FT
Follow up with Dr. Smith in his office in two weeks. Please call the office for appointment if needed.  Follow up with Dr. Smith in his office in two weeks. Please call the office for appointment if needed.   Follow up with Primary care physician.

## 2022-03-03 VITALS
SYSTOLIC BLOOD PRESSURE: 131 MMHG | DIASTOLIC BLOOD PRESSURE: 71 MMHG | HEART RATE: 65 BPM | WEIGHT: 158.73 LBS | OXYGEN SATURATION: 95 % | RESPIRATION RATE: 17 BRPM | TEMPERATURE: 98 F

## 2022-03-03 LAB
HCV AB S/CO SERPL IA: 0.1 S/CO — SIGNIFICANT CHANGE UP (ref 0–0.99)
HCV AB SERPL-IMP: SIGNIFICANT CHANGE UP

## 2022-03-03 PROCEDURE — 86803 HEPATITIS C AB TEST: CPT

## 2022-03-03 PROCEDURE — 93005 ELECTROCARDIOGRAM TRACING: CPT

## 2022-03-03 PROCEDURE — 83605 ASSAY OF LACTIC ACID: CPT

## 2022-03-03 PROCEDURE — 96374 THER/PROPH/DIAG INJ IV PUSH: CPT

## 2022-03-03 PROCEDURE — 85025 COMPLETE CBC W/AUTO DIFF WBC: CPT

## 2022-03-03 PROCEDURE — 84484 ASSAY OF TROPONIN QUANT: CPT

## 2022-03-03 PROCEDURE — 99285 EMERGENCY DEPT VISIT HI MDM: CPT | Mod: 25

## 2022-03-03 PROCEDURE — 83690 ASSAY OF LIPASE: CPT

## 2022-03-03 PROCEDURE — 76700 US EXAM ABDOM COMPLETE: CPT

## 2022-03-03 PROCEDURE — 36415 COLL VENOUS BLD VENIPUNCTURE: CPT

## 2022-03-03 PROCEDURE — 99024 POSTOP FOLLOW-UP VISIT: CPT

## 2022-03-03 PROCEDURE — 71045 X-RAY EXAM CHEST 1 VIEW: CPT

## 2022-03-03 PROCEDURE — 87635 SARS-COV-2 COVID-19 AMP PRB: CPT

## 2022-03-03 PROCEDURE — 74250 X-RAY XM SM INT 1CNTRST STD: CPT

## 2022-03-03 PROCEDURE — 80053 COMPREHEN METABOLIC PANEL: CPT

## 2022-03-03 PROCEDURE — 74177 CT ABD & PELVIS W/CONTRAST: CPT | Mod: MA

## 2022-03-03 PROCEDURE — 71275 CT ANGIOGRAPHY CHEST: CPT | Mod: MA

## 2022-03-03 RX ORDER — METOPROLOL TARTRATE 50 MG
1 TABLET ORAL
Qty: 0 | Refills: 0 | DISCHARGE
Start: 2022-03-03

## 2022-03-03 RX ORDER — ACETAMINOPHEN 500 MG
650 TABLET ORAL ONCE
Refills: 0 | Status: COMPLETED | OUTPATIENT
Start: 2022-03-03 | End: 2022-03-03

## 2022-03-03 RX ORDER — METOPROLOL TARTRATE 50 MG
0 TABLET ORAL
Qty: 0 | Refills: 0 | DISCHARGE

## 2022-03-03 RX ADMIN — Medication 25 MILLIGRAM(S): at 05:28

## 2022-03-03 RX ADMIN — Medication 650 MILLIGRAM(S): at 06:30

## 2022-03-03 RX ADMIN — Medication 650 MILLIGRAM(S): at 05:32

## 2022-03-03 NOTE — DISCHARGE NOTE NURSING/CASE MANAGEMENT/SOCIAL WORK - PATIENT PORTAL LINK FT
You can access the FollowMyHealth Patient Portal offered by [facility] by registering at the following website: [email or web address] By joining [employer]’s FollowMyHealth portal, you will also be able to view your health information using other applications (apps) compatible with our system.

## 2022-03-03 NOTE — DISCHARGE NOTE NURSING/CASE MANAGEMENT/SOCIAL WORK - NSDCPEFALRISK_GEN_ALL_CORE
For information on Fall & Injury Prevention, visit: [email or web address] OR  [email or web address] OR  https://www.cdc.gov/steadi/patient.html

## 2022-03-03 NOTE — DISCHARGE NOTE NURSING/CASE MANAGEMENT/SOCIAL WORK - NSDCFUADDAPPT_GEN_ALL_CORE_FT
Follow up with Dr. Smith in his office in two weeks. Please call the office for appointment if needed.   Follow up with Primary care physician.

## 2022-03-03 NOTE — PROGRESS NOTE ADULT - SUBJECTIVE AND OBJECTIVE BOX
Admitted yesterday secondary to abdominal pain, nausea and vomiting, ct findings suggestive of ileus vs early SBO.  Pt subsequently underwent UGI series/Gastrografin challenge which showed complete transit through SB to colon.  No evidence of SBO.    THis morning she is feeling better.  Mild abdominal discomfort but no tenderness.  She reports multiple BM and Flatus (loose) most likely secondary to passage of oral contrast.    Vital Signs Last 24 Hrs  T(C): 36.7 (03 Mar 2022 05:15), Max: 36.7 (02 Mar 2022 11:58)  T(F): 98.1 (03 Mar 2022 05:15), Max: 98.1 (03 Mar 2022 05:15)  HR: 65 (03 Mar 2022 05:15) (61 - 72)  BP: 131/71 (03 Mar 2022 05:15) (125/70 - 143/67)  BP(mean): --  RR: 17 (03 Mar 2022 05:15) (16 - 17)  SpO2: 95% (03 Mar 2022 05:15) (93% - 96%)                            15.0   8.86  )-----------( 252      ( 01 Mar 2022 20:26 )             43.3       03-01    142  |  107  |  17  ----------------------------<  106<H>  3.9   |  28  |  0.74    Ca    9.8      01 Mar 2022 20:26    TPro  7.7  /  Alb  4.1  /  TBili  0.6  /  DBili  x   /  AST  28  /  ALT  29  /  AlkPhos  84  03-01      This morning labs pending (drawn by Phlebotomist while I was at bedside)    Physical Exam:  Awake alert and oriented x3 in no acute distress. NCAT, PERRLA, EOMI  Lungs clear bilaterally without wheezes rhonchi or rales.  Regular rate and rhythm  Abdomen soft, nontender, nondistended, positive bowel sounds in all 4 quadrants. No evidence of hernia or masses.   Extremities without edema or tenderness.

## 2022-03-03 NOTE — PROGRESS NOTE ADULT - ASSESSMENT
Resolving ileus vs early SBO.  Presenting symptoms may have been stress related or secondary to a gastroenteritis type picture.  Tolerated clear liquids last night.  Will advance to regular diet.  Discharge to home today if tolerates.   Resolving ileus vs early SBO.  Presenting symptoms may have been stress related or secondary to a gastroenteritis type picture.  Tolerated clear liquids last night.  Will advance to regular diet.  Discharge to home today if tolerates.    **ADDENDUM**  Patient tolerated breakfast without issue, no post-prandial c/o pain, nausea, or vomiting  For d/c to home today

## 2022-03-22 PROBLEM — Z00.00 ENCOUNTER FOR PREVENTIVE HEALTH EXAMINATION: Status: ACTIVE | Noted: 2022-03-22

## 2022-04-29 NOTE — ED PROVIDER NOTE - TEMPLATE, MLM
Pt contacted Call Center requested refill of their medication  Medication Name:  transdermal buprenorphine (Butrans)        Dosage of Med: 7 5 mcg       Frequency of Med: Place 1 patch on the skin every 7 days      Remaining Medication: 1       Pharmacy and Location:    [facility]/pharmacy #[id number]- [address]   [address] [place] [address]   Phone:  [phone]     Pt  Preferred Callback Phone Number: [phone]      Thank you  Abdominal Pain, N/V/D